# Patient Record
Sex: MALE | Race: WHITE | NOT HISPANIC OR LATINO | URBAN - METROPOLITAN AREA
[De-identification: names, ages, dates, MRNs, and addresses within clinical notes are randomized per-mention and may not be internally consistent; named-entity substitution may affect disease eponyms.]

---

## 2018-07-05 RX ORDER — FENTANYL CITRATE 50 UG/ML
50 INJECTION INTRAVENOUS
Qty: 0 | Refills: 0 | Status: DISCONTINUED | OUTPATIENT
Start: 2018-07-06 | End: 2018-07-06

## 2018-07-05 RX ORDER — OXYCODONE HYDROCHLORIDE 5 MG/1
10 TABLET ORAL EVERY 6 HOURS
Qty: 0 | Refills: 0 | Status: DISCONTINUED | OUTPATIENT
Start: 2018-07-06 | End: 2018-07-06

## 2018-07-06 ENCOUNTER — OUTPATIENT (OUTPATIENT)
Dept: OUTPATIENT SERVICES | Facility: HOSPITAL | Age: 18
LOS: 1 days | Discharge: HOME | End: 2018-07-06
Payer: COMMERCIAL

## 2018-07-06 ENCOUNTER — RESULT REVIEW (OUTPATIENT)
Age: 18
End: 2018-07-06

## 2018-07-06 VITALS
WEIGHT: 160.94 LBS | SYSTOLIC BLOOD PRESSURE: 111 MMHG | HEART RATE: 73 BPM | TEMPERATURE: 98 F | OXYGEN SATURATION: 100 % | DIASTOLIC BLOOD PRESSURE: 74 MMHG | RESPIRATION RATE: 16 BRPM | HEIGHT: 73 IN

## 2018-07-06 VITALS
SYSTOLIC BLOOD PRESSURE: 130 MMHG | TEMPERATURE: 98 F | HEART RATE: 66 BPM | DIASTOLIC BLOOD PRESSURE: 72 MMHG | OXYGEN SATURATION: 99 % | RESPIRATION RATE: 16 BRPM

## 2018-07-06 DIAGNOSIS — Z98.890 OTHER SPECIFIED POSTPROCEDURAL STATES: Chronic | ICD-10-CM

## 2018-07-06 PROCEDURE — 88300 SURGICAL PATH GROSS: CPT | Mod: 26

## 2018-07-06 RX ORDER — SODIUM CHLORIDE 9 MG/ML
1000 INJECTION, SOLUTION INTRAVENOUS
Qty: 0 | Refills: 0 | Status: DISCONTINUED | OUTPATIENT
Start: 2018-07-06 | End: 2018-07-06

## 2018-07-06 RX ORDER — OXYCODONE HYDROCHLORIDE 5 MG/1
5 TABLET ORAL EVERY 4 HOURS
Qty: 0 | Refills: 0 | Status: DISCONTINUED | OUTPATIENT
Start: 2018-07-06 | End: 2018-07-06

## 2018-07-06 RX ORDER — ONDANSETRON 8 MG/1
4 TABLET, FILM COATED ORAL ONCE
Qty: 0 | Refills: 0 | Status: DISCONTINUED | OUTPATIENT
Start: 2018-07-06 | End: 2018-07-06

## 2018-07-06 RX ADMIN — OXYCODONE HYDROCHLORIDE 10 MILLIGRAM(S): 5 TABLET ORAL at 17:03

## 2018-07-06 NOTE — BRIEF OPERATIVE NOTE - PROCEDURE
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

## 2018-07-06 NOTE — ASU PATIENT PROFILE, ADULT - PMH
Allergic rhinitis    Deviated nasal septum    Eczema    Hernia  bilateral inguinal and umbilical age 1  History of mononucleosis    Lactose intolerance    Tonsillar hypertrophy

## 2018-07-06 NOTE — BRIEF OPERATIVE NOTE - POST-OP DX
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

## 2018-07-09 LAB — SURGICAL PATHOLOGY FINAL REPORT - CH: SIGNIFICANT CHANGE UP

## 2018-07-11 DIAGNOSIS — J34.89 OTHER SPECIFIED DISORDERS OF NOSE AND NASAL SINUSES: ICD-10-CM

## 2018-07-11 DIAGNOSIS — J34.2 DEVIATED NASAL SEPTUM: ICD-10-CM

## 2018-07-11 DIAGNOSIS — J34.3 HYPERTROPHY OF NASAL TURBINATES: ICD-10-CM

## 2018-07-11 DIAGNOSIS — J30.1 ALLERGIC RHINITIS DUE TO POLLEN: ICD-10-CM

## 2018-07-11 DIAGNOSIS — J32.9 CHRONIC SINUSITIS, UNSPECIFIED: ICD-10-CM

## 2018-07-11 DIAGNOSIS — L30.9 DERMATITIS, UNSPECIFIED: ICD-10-CM

## 2018-07-11 DIAGNOSIS — Z87.01 PERSONAL HISTORY OF PNEUMONIA (RECURRENT): ICD-10-CM

## 2018-07-11 DIAGNOSIS — E73.9 LACTOSE INTOLERANCE, UNSPECIFIED: ICD-10-CM

## 2018-07-11 DIAGNOSIS — J35.1 HYPERTROPHY OF TONSILS: ICD-10-CM

## 2020-01-22 ENCOUNTER — TRANSCRIPTION ENCOUNTER (OUTPATIENT)
Age: 20
End: 2020-01-22

## 2020-11-12 ENCOUNTER — TRANSCRIPTION ENCOUNTER (OUTPATIENT)
Age: 20
End: 2020-11-12

## 2020-12-30 ENCOUNTER — TRANSCRIPTION ENCOUNTER (OUTPATIENT)
Age: 20
End: 2020-12-30

## 2021-03-05 ENCOUNTER — TRANSCRIPTION ENCOUNTER (OUTPATIENT)
Age: 21
End: 2021-03-05

## 2021-04-24 ENCOUNTER — TRANSCRIPTION ENCOUNTER (OUTPATIENT)
Age: 21
End: 2021-04-24

## 2021-07-12 ENCOUNTER — TRANSCRIPTION ENCOUNTER (OUTPATIENT)
Age: 21
End: 2021-07-12

## 2021-07-12 ENCOUNTER — EMERGENCY (EMERGENCY)
Facility: HOSPITAL | Age: 21
LOS: 0 days | Discharge: HOME | End: 2021-07-12
Attending: EMERGENCY MEDICINE
Payer: COMMERCIAL

## 2021-07-12 VITALS
RESPIRATION RATE: 22 BRPM | HEART RATE: 150 BPM | SYSTOLIC BLOOD PRESSURE: 98 MMHG | DIASTOLIC BLOOD PRESSURE: 54 MMHG | TEMPERATURE: 98 F | OXYGEN SATURATION: 93 %

## 2021-07-12 VITALS — HEIGHT: 73 IN | WEIGHT: 184.97 LBS

## 2021-07-12 DIAGNOSIS — T78.40XA ALLERGY, UNSPECIFIED, INITIAL ENCOUNTER: ICD-10-CM

## 2021-07-12 DIAGNOSIS — R06.02 SHORTNESS OF BREATH: ICD-10-CM

## 2021-07-12 DIAGNOSIS — Z86.19 PERSONAL HISTORY OF OTHER INFECTIOUS AND PARASITIC DISEASES: ICD-10-CM

## 2021-07-12 DIAGNOSIS — Z87.2 PERSONAL HISTORY OF DISEASES OF THE SKIN AND SUBCUTANEOUS TISSUE: ICD-10-CM

## 2021-07-12 DIAGNOSIS — Z87.19 PERSONAL HISTORY OF OTHER DISEASES OF THE DIGESTIVE SYSTEM: ICD-10-CM

## 2021-07-12 DIAGNOSIS — T36.8X1A POISONING BY OTHER SYSTEMIC ANTIBIOTICS, ACCIDENTAL (UNINTENTIONAL), INITIAL ENCOUNTER: ICD-10-CM

## 2021-07-12 DIAGNOSIS — Z87.09 PERSONAL HISTORY OF OTHER DISEASES OF THE RESPIRATORY SYSTEM: ICD-10-CM

## 2021-07-12 DIAGNOSIS — Z98.890 OTHER SPECIFIED POSTPROCEDURAL STATES: ICD-10-CM

## 2021-07-12 DIAGNOSIS — R21 RASH AND OTHER NONSPECIFIC SKIN ERUPTION: ICD-10-CM

## 2021-07-12 DIAGNOSIS — R22.0 LOCALIZED SWELLING, MASS AND LUMP, HEAD: ICD-10-CM

## 2021-07-12 DIAGNOSIS — Z98.890 OTHER SPECIFIED POSTPROCEDURAL STATES: Chronic | ICD-10-CM
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
## 2021-07-12 DIAGNOSIS — Z91.011 ALLERGY TO MILK PRODUCTS: ICD-10-CM

## 2021-07-12 PROBLEM — J30.9 ALLERGIC RHINITIS, UNSPECIFIED: Chronic | Status: ACTIVE | Noted: 2018-07-06

## 2021-07-12 PROBLEM — J34.2 DEVIATED NASAL SEPTUM: Chronic | Status: ACTIVE | Noted: 2018-07-06

## 2021-07-12 PROBLEM — L30.9 DERMATITIS, UNSPECIFIED: Chronic | Status: ACTIVE | Noted: 2018-07-06

## 2021-07-12 PROBLEM — E73.9 LACTOSE INTOLERANCE, UNSPECIFIED: Chronic | Status: ACTIVE | Noted: 2018-07-06

## 2021-07-12 PROBLEM — K46.9 UNSPECIFIED ABDOMINAL HERNIA WITHOUT OBSTRUCTION OR GANGRENE: Chronic | Status: ACTIVE | Noted: 2018-07-06

## 2021-07-12 PROBLEM — J35.1 HYPERTROPHY OF TONSILS: Chronic | Status: ACTIVE | Noted: 2018-07-06

## 2021-07-12 PROCEDURE — 99284 EMERGENCY DEPT VISIT MOD MDM: CPT | Mod: 25

## 2021-07-12 PROCEDURE — 93005 ELECTROCARDIOGRAM TRACING: CPT

## 2021-07-12 PROCEDURE — 96375 TX/PRO/DX INJ NEW DRUG ADDON: CPT

## 2021-07-12 PROCEDURE — 99284 EMERGENCY DEPT VISIT MOD MDM: CPT

## 2021-07-12 PROCEDURE — 93010 ELECTROCARDIOGRAM REPORT: CPT

## 2021-07-12 PROCEDURE — 96374 THER/PROPH/DIAG INJ IV PUSH: CPT

## 2021-07-12 RX ORDER — SODIUM CHLORIDE 9 MG/ML
1000 INJECTION INTRAMUSCULAR; INTRAVENOUS; SUBCUTANEOUS ONCE
Refills: 0 | Status: COMPLETED | OUTPATIENT
Start: 2021-07-12 | End: 2021-07-12

## 2021-07-12 RX ORDER — DIPHENHYDRAMINE HCL 50 MG
25 CAPSULE ORAL ONCE
Refills: 0 | Status: COMPLETED | OUTPATIENT
Start: 2021-07-12 | End: 2021-07-12

## 2021-07-12 RX ORDER — FAMOTIDINE 10 MG/ML
20 INJECTION INTRAVENOUS ONCE
Refills: 0 | Status: COMPLETED | OUTPATIENT
Start: 2021-07-12 | End: 2021-07-12

## 2021-07-12 RX ORDER — FAMOTIDINE 10 MG/ML
1 INJECTION INTRAVENOUS
Qty: 5 | Refills: 0
Start: 2021-07-12 | End: 2021-07-16

## 2021-07-12 RX ORDER — NITROFURANTOIN MACROCRYSTAL 50 MG
1 CAPSULE ORAL
Qty: 14 | Refills: 0
Start: 2021-07-12 | End: 2021-07-18

## 2021-07-12 RX ORDER — DIPHENHYDRAMINE HCL 50 MG
1 CAPSULE ORAL
Qty: 21 | Refills: 0
Start: 2021-07-12 | End: 2021-07-18

## 2021-07-12 RX ADMIN — Medication 125 MILLIGRAM(S): at 16:52

## 2021-07-12 RX ADMIN — SODIUM CHLORIDE 1000 MILLILITER(S): 9 INJECTION INTRAMUSCULAR; INTRAVENOUS; SUBCUTANEOUS at 16:55

## 2021-07-12 RX ADMIN — FAMOTIDINE 20 MILLIGRAM(S): 10 INJECTION INTRAVENOUS at 16:52

## 2021-07-12 RX ADMIN — Medication 25 MILLIGRAM(S): at 16:52

## 2021-07-12 NOTE — ED PROVIDER NOTE - NS_ ATTENDINGSCRIBEDETAILS _ED_A_ED_FT
I, Choco Gregory MD,  performed the initial face to face bedside interview with this patient regarding history of present illness, review of symptoms and relevant past medical, social and family history.  I completed an independent physical examination.    The history, relevant review of systems, past medical and surgical history, medical decision making, and physical examination was documented by the scribe in my presence and I attest to the accuracy of the documentation.

## 2021-07-12 NOTE — ED PROVIDER NOTE - OBJECTIVE STATEMENT
20 y/o M with PMHx of s/p hernia repair presents to the ED BIB mother from home c/o +diffuse erythematous rash to body s/p taking 1st dose of ciprofloxacin this afternoon. Reports going to UC yesterday for suspected UTI, was prescribed abx. After taking today pt immediately began having rash with some +SOB and +facial swelling. In ED SOB now resolved. No fever or itchy eyes. Did not take anything for allergic rxn PTA. NKDA.

## 2021-07-12 NOTE — ED ADULT TRIAGE NOTE - CHIEF COMPLAINT QUOTE
Patient having allergic reaction starting 15 mins ago, about 20 mins ago took Cipro for the first time.  Patient has hives, swelling to the face and trouble breathing.

## 2021-07-12 NOTE — ED PROVIDER NOTE - PATIENT PORTAL LINK FT
You can access the FollowMyHealth Patient Portal offered by [facility] by registering at the following website: [email or web address]. By joining [email or web address]’s FollowMyHealth portal, you will also be able to view your health information using other applications (apps) compatible with our system.

## 2021-07-12 NOTE — ED ADULT NURSE NOTE - NSIMPLEMENTINTERV_GEN_ALL_ED
Implemented All Universal Safety Interventions:  [place] to call system. Call bell, personal items and telephone within reach. Instruct patient to call for assistance. Room bathroom lighting operational. Non-slip footwear when patient is off stretcher. Physically safe environment: no spills, clutter or unnecessary equipment. Stretcher in lowest position, wheels locked, appropriate side rails in place.

## 2021-07-12 NOTE — ED ADULT NURSE NOTE - OBJECTIVE STATEMENT
Pt states 20 minutes after taking Cipro 500mg and OTC CranX and developed allergic reaction. Pt to ED with facial swelling, hives, urticaria. Pt denies difficulty breathing at this time but was dyspneic prior to ED arrival. IV established with NS running at this time. Awaiting MD mccurdy.

## 2021-07-13 PROBLEM — Z00.00 ENCOUNTER FOR PREVENTIVE HEALTH EXAMINATION: Status: ACTIVE | Noted: 2021-07-13

## 2021-07-14 ENCOUNTER — APPOINTMENT (OUTPATIENT)
Dept: UROLOGY | Facility: CLINIC | Age: 21
End: 2021-07-14
Payer: COMMERCIAL

## 2021-07-14 VITALS
SYSTOLIC BLOOD PRESSURE: 138 MMHG | HEIGHT: 74 IN | WEIGHT: 180 LBS | OXYGEN SATURATION: 97 % | BODY MASS INDEX: 23.1 KG/M2 | DIASTOLIC BLOOD PRESSURE: 88 MMHG | HEART RATE: 95 BPM

## 2021-07-14 DIAGNOSIS — Z78.9 OTHER SPECIFIED HEALTH STATUS: ICD-10-CM

## 2021-07-14 DIAGNOSIS — Z72.0 TOBACCO USE: ICD-10-CM

## 2021-07-14 DIAGNOSIS — R30.0 DYSURIA: ICD-10-CM

## 2021-07-14 PROCEDURE — 99243 OFF/OP CNSLTJ NEW/EST LOW 30: CPT

## 2021-07-14 PROCEDURE — 99072 ADDL SUPL MATRL&STAF TM PHE: CPT

## 2021-07-14 NOTE — HISTORY OF PRESENT ILLNESS
[FreeTextEntry1] : This patient states that he has developed intermittent dysuria and left testicular discomfort over the past several weeks.  He indicates that he had full STD testing and a negative urine analysis.  He denies sexual relations with any new partners in the recent past.

## 2021-07-14 NOTE — END OF VISIT
[FreeTextEntry3] : A scrotal sonogram is ordered and I prescribed doxycycline.  Depending on his response and the results follow-up will be determined

## 2021-07-15 ENCOUNTER — OUTPATIENT (OUTPATIENT)
Dept: OUTPATIENT SERVICES | Facility: HOSPITAL | Age: 21
LOS: 1 days | End: 2021-07-15
Payer: COMMERCIAL

## 2021-07-15 ENCOUNTER — APPOINTMENT (OUTPATIENT)
Dept: ULTRASOUND IMAGING | Facility: CLINIC | Age: 21
End: 2021-07-15
Payer: COMMERCIAL

## 2021-07-15 DIAGNOSIS — Z98.890 OTHER SPECIFIED POSTPROCEDURAL STATES: Chronic | ICD-10-CM

## 2021-07-15 DIAGNOSIS — N50.812 LEFT TESTICULAR PAIN: ICD-10-CM

## 2021-07-15 PROCEDURE — 93975 VASCULAR STUDY: CPT | Mod: 26

## 2021-07-15 PROCEDURE — 93975 VASCULAR STUDY: CPT

## 2021-08-24 ENCOUNTER — APPOINTMENT (OUTPATIENT)
Dept: UROLOGY | Facility: CLINIC | Age: 21
End: 2021-08-24
Payer: COMMERCIAL

## 2021-08-24 VITALS
SYSTOLIC BLOOD PRESSURE: 114 MMHG | OXYGEN SATURATION: 98 % | BODY MASS INDEX: 23.1 KG/M2 | HEART RATE: 60 BPM | DIASTOLIC BLOOD PRESSURE: 72 MMHG | TEMPERATURE: 97.2 F | WEIGHT: 180 LBS | HEIGHT: 74 IN

## 2021-08-24 DIAGNOSIS — N50.812 LEFT TESTICULAR PAIN: ICD-10-CM

## 2021-08-24 DIAGNOSIS — I86.1 SCROTAL VARICES: ICD-10-CM

## 2021-08-24 PROCEDURE — 99213 OFFICE O/P EST LOW 20 MIN: CPT

## 2021-08-24 PROCEDURE — 55250 REMOVAL OF SPERM DUCT(S): CPT

## 2021-08-24 NOTE — HISTORY OF PRESENT ILLNESS
[FreeTextEntry1] : 21 year old man seen 08/24/2021 with complaint of left testicular pain. This began about a month ago. Patient was seen by Dr. Perera and his scrotal US shows a mild left varicocele. Patient reports his left testicular discomfort remains the same, reports it is not painful just "uncomfortable". \par It is moderate in severity. Nothing makes the symptoms better, nothing makes sx worse. \par It is associated with nothing.\par No hematuria, no dysuria, no frequency, no urgency, no hesitancy, no straining. No incontinence. \par No fevers, no chills, no nausea, no vomiting, no flank pain. \par \par No family history contributory to Varicocele \par

## 2021-08-24 NOTE — ASSESSMENT
[FreeTextEntry1] : 22 yo male with c/o of left testicular discomfort. Left spermatic cord proximal to the testicle very sensitive to gentle palpation on exam. Scrotal US with mild left varicocele.\par Discussed may cause scrotal pain/discomfort, decreased testicle size, poor sperm production and decreased semen quality\par Presence of a varicocele does not necessitate surgical correction\par Recommended patient have a semen analysis to R/O infertility\par Discussed if semen analysis is normal, then Varicocelectomy can be performed electively for symptom relief. \par RTO after semen analysis \par \par

## 2021-08-24 NOTE — PHYSICAL EXAM
[General Appearance - Well Developed] : well developed [General Appearance - Well Nourished] : well nourished [Normal Appearance] : normal appearance [Well Groomed] : well groomed [General Appearance - In No Acute Distress] : no acute distress [Abdomen Soft] : soft [Abdomen Tenderness] : non-tender [Costovertebral Angle Tenderness] : no ~M costovertebral angle tenderness [Urethral Meatus] : meatus normal [Urinary Bladder Findings] : the bladder was normal on palpation [Scrotum] : the scrotum was normal [Epididymis] : the epididymides were normal [Testes Tenderness] : no tenderness of the testes [Testes Mass (___cm)] : there were no testicular masses [Edema] : no peripheral edema [] : no respiratory distress [Respiration, Rhythm And Depth] : normal respiratory rhythm and effort [Exaggerated Use Of Accessory Muscles For Inspiration] : no accessory muscle use [Oriented To Time, Place, And Person] : oriented to person, place, and time [Affect] : the affect was normal [Mood] : the mood was normal [Not Anxious] : not anxious [Normal Station and Gait] : the gait and station were normal for the patient's age [No Focal Deficits] : no focal deficits [No Palpable Adenopathy] : no palpable adenopathy [FreeTextEntry1] : Left spermatic cord proximal to the testicle very sensitive to gentle palpation. Pt refused rectal exam to assess for PFD

## 2021-09-07 ENCOUNTER — APPOINTMENT (OUTPATIENT)
Dept: UROLOGY | Facility: CLINIC | Age: 21
End: 2021-09-07

## 2021-12-22 ENCOUNTER — TRANSCRIPTION ENCOUNTER (OUTPATIENT)
Age: 21
End: 2021-12-22

## 2022-02-22 ENCOUNTER — EMERGENCY (EMERGENCY)
Facility: HOSPITAL | Age: 22
LOS: 0 days | Discharge: HOME | End: 2022-02-23
Attending: EMERGENCY MEDICINE
Payer: COMMERCIAL

## 2022-02-22 VITALS
OXYGEN SATURATION: 96 % | HEIGHT: 72 IN | WEIGHT: 169.98 LBS | DIASTOLIC BLOOD PRESSURE: 80 MMHG | SYSTOLIC BLOOD PRESSURE: 122 MMHG | HEART RATE: 143 BPM | RESPIRATION RATE: 18 BRPM | TEMPERATURE: 99 F
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
## 2022-02-22 DIAGNOSIS — R22.0 LOCALIZED SWELLING, MASS AND LUMP, HEAD: ICD-10-CM

## 2022-02-22 DIAGNOSIS — T78.40XA ALLERGY, UNSPECIFIED, INITIAL ENCOUNTER: ICD-10-CM

## 2022-02-22 DIAGNOSIS — Z98.890 OTHER SPECIFIED POSTPROCEDURAL STATES: Chronic | ICD-10-CM

## 2022-02-22 LAB
ANION GAP SERPL CALC-SCNC: 9 MMOL/L — SIGNIFICANT CHANGE UP (ref 5–17)
BASOPHILS # BLD AUTO: 0.02 K/UL — SIGNIFICANT CHANGE UP (ref 0–0.2)
BASOPHILS NFR BLD AUTO: 0.2 % — SIGNIFICANT CHANGE UP (ref 0–2)
BUN SERPL-MCNC: 14 MG/DL — SIGNIFICANT CHANGE UP (ref 7–23)
CALCIUM SERPL-MCNC: 9.8 MG/DL — SIGNIFICANT CHANGE UP (ref 8.5–10.1)
CHLORIDE SERPL-SCNC: 104 MMOL/L — SIGNIFICANT CHANGE UP (ref 96–108)
CO2 SERPL-SCNC: 26 MMOL/L — SIGNIFICANT CHANGE UP (ref 22–31)
CREAT SERPL-MCNC: 1.33 MG/DL — HIGH (ref 0.5–1.3)
EOSINOPHIL # BLD AUTO: 0.07 K/UL — SIGNIFICANT CHANGE UP (ref 0–0.5)
EOSINOPHIL NFR BLD AUTO: 0.6 % — SIGNIFICANT CHANGE UP (ref 0–6)
GLUCOSE SERPL-MCNC: 128 MG/DL — HIGH (ref 70–99)
HCT VFR BLD CALC: 50.9 % — HIGH (ref 39–50)
HGB BLD-MCNC: 17.6 G/DL — HIGH (ref 13–17)
IMM GRANULOCYTES NFR BLD AUTO: 0.3 % — SIGNIFICANT CHANGE UP (ref 0–1.5)
LYMPHOCYTES # BLD AUTO: 1.69 K/UL — SIGNIFICANT CHANGE UP (ref 1–3.3)
LYMPHOCYTES # BLD AUTO: 13.5 % — SIGNIFICANT CHANGE UP (ref 13–44)
MCHC RBC-ENTMCNC: 30.9 PG — SIGNIFICANT CHANGE UP (ref 27–34)
MCHC RBC-ENTMCNC: 34.6 GM/DL — SIGNIFICANT CHANGE UP (ref 32–36)
MCV RBC AUTO: 89.3 FL — SIGNIFICANT CHANGE UP (ref 80–100)
MONOCYTES # BLD AUTO: 0.89 K/UL — SIGNIFICANT CHANGE UP (ref 0–0.9)
MONOCYTES NFR BLD AUTO: 7.1 % — SIGNIFICANT CHANGE UP (ref 2–14)
NEUTROPHILS # BLD AUTO: 9.78 K/UL — HIGH (ref 1.8–7.4)
NEUTROPHILS NFR BLD AUTO: 78.3 % — HIGH (ref 43–77)
PLATELET # BLD AUTO: 289 K/UL — SIGNIFICANT CHANGE UP (ref 150–400)
POTASSIUM SERPL-MCNC: 3.5 MMOL/L — SIGNIFICANT CHANGE UP (ref 3.5–5.3)
POTASSIUM SERPL-SCNC: 3.5 MMOL/L — SIGNIFICANT CHANGE UP (ref 3.5–5.3)
RBC # BLD: 5.7 M/UL — SIGNIFICANT CHANGE UP (ref 4.2–5.8)
RBC # FLD: 12.3 % — SIGNIFICANT CHANGE UP (ref 10.3–14.5)
SODIUM SERPL-SCNC: 139 MMOL/L — SIGNIFICANT CHANGE UP (ref 135–145)
WBC # BLD: 12.49 K/UL — HIGH (ref 3.8–10.5)
WBC # FLD AUTO: 12.49 K/UL — HIGH (ref 3.8–10.5)

## 2022-02-22 PROCEDURE — 93005 ELECTROCARDIOGRAM TRACING: CPT

## 2022-02-22 PROCEDURE — 96374 THER/PROPH/DIAG INJ IV PUSH: CPT

## 2022-02-22 PROCEDURE — 99284 EMERGENCY DEPT VISIT MOD MDM: CPT | Mod: 25

## 2022-02-22 PROCEDURE — 80048 BASIC METABOLIC PNL TOTAL CA: CPT

## 2022-02-22 PROCEDURE — 93010 ELECTROCARDIOGRAM REPORT: CPT | Mod: 76

## 2022-02-22 PROCEDURE — 85025 COMPLETE CBC W/AUTO DIFF WBC: CPT

## 2022-02-22 PROCEDURE — 96375 TX/PRO/DX INJ NEW DRUG ADDON: CPT

## 2022-02-22 PROCEDURE — 99285 EMERGENCY DEPT VISIT HI MDM: CPT

## 2022-02-22 PROCEDURE — 36415 COLL VENOUS BLD VENIPUNCTURE: CPT

## 2022-02-22 PROCEDURE — 82550 ASSAY OF CK (CPK): CPT

## 2022-02-22 RX ORDER — DIPHENHYDRAMINE HCL 50 MG
50 CAPSULE ORAL ONCE
Refills: 0 | Status: COMPLETED | OUTPATIENT
Start: 2022-02-22 | End: 2022-02-22

## 2022-02-22 RX ORDER — DEXAMETHASONE 0.5 MG/5ML
6 ELIXIR ORAL ONCE
Refills: 0 | Status: COMPLETED | OUTPATIENT
Start: 2022-02-22 | End: 2022-02-22

## 2022-02-22 RX ORDER — SODIUM CHLORIDE 9 MG/ML
1000 INJECTION INTRAMUSCULAR; INTRAVENOUS; SUBCUTANEOUS ONCE
Refills: 0 | Status: COMPLETED | OUTPATIENT
Start: 2022-02-22 | End: 2022-02-22

## 2022-02-22 RX ORDER — FAMOTIDINE 10 MG/ML
20 INJECTION INTRAVENOUS ONCE
Refills: 0 | Status: COMPLETED | OUTPATIENT
Start: 2022-02-22 | End: 2022-02-22

## 2022-02-22 RX ADMIN — Medication 50 MILLIGRAM(S): at 23:12

## 2022-02-22 RX ADMIN — FAMOTIDINE 20 MILLIGRAM(S): 10 INJECTION INTRAVENOUS at 23:12

## 2022-02-22 RX ADMIN — SODIUM CHLORIDE 1000 MILLILITER(S): 9 INJECTION INTRAMUSCULAR; INTRAVENOUS; SUBCUTANEOUS at 23:10

## 2022-02-22 RX ADMIN — Medication 6 MILLIGRAM(S): at 23:13

## 2022-02-22 NOTE — ED PROVIDER NOTE - OBJECTIVE STATEMENT
20 y/o M with no PMHx p/w facial swelling, pruritis and sob that began about 3 hours after he drank almond milk.  Pt notes he drank the almond milk at about 3 pm then started noticing pruritis and mild facial swelling around 6 pm.  He was able to play in an indoor soccer game from 9 to 9:50 pm without any CP or SOB but then noted increased facial swelling shortly after the end of the game.  Pt hasn't taken anything PTA.  Pt was seen in this ED on 7/12/21 for a suspected allergic rxn to amoxicillin and had an EKG during that visit that is similar to the EKG today.

## 2022-02-22 NOTE — ED PROVIDER NOTE - PROGRESS NOTE DETAILS
Symptoms resolved and HR decreased to 60s.  Lungs CTAB and airway patent.  Pt states he has Epi pen at home.  Pt advised to f/u with PCP/allergist and given strict return precautions.  Krunal Melendez, DO

## 2022-02-22 NOTE — ED PROVIDER NOTE - ENMT, MLM
Airway patent, Nasal mucosa clear. Mouth with normal mucosa. Throat has no vesicles, no oropharyngeal exudates and uvula is midline.  mild bilateral facial swelling

## 2022-02-22 NOTE — ED PROVIDER NOTE - NSICDXPASTMEDICALHX_GEN_ALL_CORE_FT
PAST MEDICAL HISTORY:  Allergic rhinitis     Deviated nasal septum     Eczema     Hernia bilateral inguinal and umbilical age 1    History of mononucleosis     Lactose intolerance     Tonsillar hypertrophy

## 2022-02-22 NOTE — ED PROVIDER NOTE - NSFOLLOWUPINSTRUCTIONS_ED_ALL_ED_FT
Allergic Reaction    An allergic reaction is an abnormal reaction to a substance (allergen) by the body's defense system. Common allergens include medicines, food, insect bites or stings, and blood products. The body releases certain proteins into the blood that can cause a variety of symptoms such as an itchy rash, wheezing, swelling of the face/lips/tongue/throat, abdominal pain, nausea or vomiting. An allergic reaction is usually treated with medication. If your health care provider prescribed you an epinephrine injection device, make sure to keep it with you at all times.    SEEK IMMEDIATE MEDICAL CARE IF YOU HAVE ANY OF THE FOLLOWING SYMPTOMS: allergic reaction severe enough that required you to use epinephrine, tightness in your chest, swelling around your lips/tongue/throat, abdominal pain, vomiting or diarrhea, or lightheadedness/dizziness. These symptoms may represent a serious problem that is an emergency. Do not wait to see if the symptoms will go away. Use your auto-injector pen or anaphylaxis kit as you have been instructed. Call 911 and do not drive yourself to the hospital.      Follow up with your pcp and allergist in 1-3 days

## 2022-02-22 NOTE — ED ADULT TRIAGE NOTE - CHIEF COMPLAINT QUOTE
Pt A&Ox4, presents to the ED c/o allergic reaction to almond milk. Pt c/o itching, redness, and swelling to his entire body. Pt reports mild SOB. No swelling noted to tongue. Respirations equal and unlabored. No medication PTA. Pt sent for STAT EKG. Pt A&Ox4, presents to the ED c/o allergic reaction to almond milk. Pt c/o itching, redness, and swelling to his entire body. Pt reports mild SOB. No swelling noted to tongue. Respirations equal and unlabored. SpO2 94% on room air, HR 160s. Pt states he just played soccer. No medication PTA. Pt sent for STAT EKG.

## 2022-02-22 NOTE — ED PROVIDER NOTE - PATIENT PORTAL LINK FT
You can access the FollowMyHealth Patient Portal offered by [facility] by registering at the following website: [email or web address]. By joining [employer]’s FollowMyHealth portal, you will also be able to view your health information using other applications (apps) compatible with our system.

## 2022-02-23 LAB — ADD ON TEST-SPECIMEN IN LAB: SIGNIFICANT CHANGE UP

## 2022-02-23 RX ADMIN — SODIUM CHLORIDE 1000 MILLILITER(S): 9 INJECTION INTRAMUSCULAR; INTRAVENOUS; SUBCUTANEOUS at 00:27

## 2022-02-23 NOTE — ED ADULT NURSE NOTE - OBJECTIVE STATEMENT
pt arrived to ED c/o itchiness, redness after drinking almond milk. pt states he drank almond milk today around 1500 and felt short of breath and around 1700 felt itchy and felt slightly short of breath with redness on face chest and upper extremities. pt states he has no allergies but has been to the ED in the past after a similar episode. pt states he is no longer short of breath and denies chest pain. pt denies taking any medications PTA. pt alert and oriented.

## 2022-03-18 ENCOUNTER — NON-APPOINTMENT (OUTPATIENT)
Age: 22
End: 2022-03-18

## 2022-03-18 ENCOUNTER — APPOINTMENT (OUTPATIENT)
Dept: CARDIOLOGY | Facility: CLINIC | Age: 22
End: 2022-03-18
Payer: COMMERCIAL

## 2022-03-18 VITALS
HEIGHT: 74 IN | WEIGHT: 181 LBS | BODY MASS INDEX: 23.23 KG/M2 | HEART RATE: 64 BPM | SYSTOLIC BLOOD PRESSURE: 112 MMHG | DIASTOLIC BLOOD PRESSURE: 70 MMHG | OXYGEN SATURATION: 99 %

## 2022-03-18 PROCEDURE — 93000 ELECTROCARDIOGRAM COMPLETE: CPT

## 2022-03-18 PROCEDURE — 99203 OFFICE O/P NEW LOW 30 MIN: CPT

## 2022-03-18 NOTE — CARDIOLOGY SUMMARY
[de-identified] : 3/18/22.  Sinus  Rhythm with short LA interval\par Increased QRS voltage\par Nonspecific ST abnormality

## 2022-03-18 NOTE — PHYSICAL EXAM
[No Acute Distress] : no acute distress [Normal S1, S2] : normal S1, S2 [No Murmur] : no murmur [Clear Lung Fields] : clear lung fields [Soft] : abdomen soft [Normal Gait] : normal gait [No Edema] : no edema [No Rash] : no rash [Moves all extremities] : moves all extremities [Alert and Oriented] : alert and oriented [de-identified] : Slender [de-identified] : Pupils are round [de-identified] : Wearing a face mask [de-identified] : No JVD

## 2022-03-18 NOTE — HISTORY OF PRESENT ILLNESS
[FreeTextEntry1] : Roberto Mera is a 21-year-old man with no chronic medical problems who presents for cardiology consultation because of an abnormality on his ECG.\par \par He was seen in the emergency department at [facility] several weeks ago following an allergic reaction -- an ECG was performed during that visit and found to be abnormal (QRS had a right axis deviation and large amplitude voltage throughout).  He has no past history of heart disease and describes a good baseline functional status and exercise capacity -- plays soccer.  There is no family history of sudden cardiac death or other premature heart disease.  He feels well -- no palpitations, chest pain, difficulty breathing, or syncope.

## 2022-03-18 NOTE — REVIEW OF SYSTEMS
[Dyspnea on exertion] : not dyspnea during exertion [Chest Discomfort] : no chest discomfort [Palpitations] : no palpitations [Syncope] : no syncope [Negative] : Heme/Lymph

## 2022-03-18 NOTE — DISCUSSION/SUMMARY
[FreeTextEntry1] : \par Abnormal ECG: I reviewed 2 ECGs that were performed while he was in the emergency department and compared it to today's tracing; all ECGs have increased QRS voltage; occasionally noted right axis deviation and mild nonspecific ST and T wave abnormality -- asymptomatic and without past heart disease.  I recommend echocardiography to exclude the presence of structural abnormalities - I will contact him by telephone to discuss results.

## 2022-03-30 ENCOUNTER — APPOINTMENT (OUTPATIENT)
Dept: CARDIOLOGY | Facility: CLINIC | Age: 22
End: 2022-03-30
Payer: COMMERCIAL

## 2022-03-30 PROCEDURE — 93306 TTE W/DOPPLER COMPLETE: CPT

## 2022-05-10 ENCOUNTER — APPOINTMENT (OUTPATIENT)
Dept: MRI IMAGING | Facility: CLINIC | Age: 22
End: 2022-05-10
Payer: COMMERCIAL

## 2022-05-10 ENCOUNTER — OUTPATIENT (OUTPATIENT)
Dept: OUTPATIENT SERVICES | Facility: HOSPITAL | Age: 22
LOS: 1 days | End: 2022-05-10
Payer: COMMERCIAL

## 2022-05-10 DIAGNOSIS — I51.9 HEART DISEASE, UNSPECIFIED: ICD-10-CM

## 2022-05-10 DIAGNOSIS — Z00.8 ENCOUNTER FOR OTHER GENERAL EXAMINATION: ICD-10-CM

## 2022-05-10 DIAGNOSIS — Z98.890 OTHER SPECIFIED POSTPROCEDURAL STATES: Chronic | ICD-10-CM

## 2022-05-10 DIAGNOSIS — R94.31 ABNORMAL ELECTROCARDIOGRAM [ECG] [EKG]: ICD-10-CM

## 2022-05-10 PROCEDURE — A9585: CPT

## 2022-05-10 PROCEDURE — 75561 CARDIAC MRI FOR MORPH W/DYE: CPT

## 2022-05-10 PROCEDURE — 75561 CARDIAC MRI FOR MORPH W/DYE: CPT | Mod: 26

## 2022-06-06 ENCOUNTER — APPOINTMENT (OUTPATIENT)
Dept: HEART FAILURE | Facility: CLINIC | Age: 22
End: 2022-06-06

## 2022-06-06 VITALS
BODY MASS INDEX: 23.49 KG/M2 | SYSTOLIC BLOOD PRESSURE: 109 MMHG | DIASTOLIC BLOOD PRESSURE: 72 MMHG | TEMPERATURE: 97.6 F | HEIGHT: 74 IN | OXYGEN SATURATION: 98 % | RESPIRATION RATE: 16 BRPM | WEIGHT: 183 LBS | HEART RATE: 62 BPM

## 2022-06-06 PROCEDURE — 99203 OFFICE O/P NEW LOW 30 MIN: CPT

## 2022-06-15 NOTE — REVIEW OF SYSTEMS
[Negative] : Heme/Lymph [FreeTextEntry2] : per HPI [FreeTextEntry5] : per HPI [FreeTextEntry1] : All systems reviewed; pertinent positives and negatives reflected in HPI

## 2022-06-15 NOTE — HISTORY OF PRESENT ILLNESS
[FreeTextEntry1] : Mr. Mera is a 21 year old male with no chronic PMH, recently found to have abnormal ECG readings. Who presents today for initial consultation of his cardiomyopathy. Referred by Mari\par \par He has no past medical history of cardiac disease and during an ER visit in February 2022 for allergic reaction to almond milk (previously has had reaction to PO antibiotic in July 2021). At his recent ER visit he was found to have abnormal ECG tracing for which he was referred to cardiologist Dr. Guerra. His ECG readings during his March visit when compared to tracing from prior ED visits were concerning for increased QRS voltage. Therefore he had TTE performed which demonstrated reduced LV function of 40-45%. Subsequently, underwent cMRI which revealed reduced global LV function (EF 42%) and normal RVF without any myocardial edema or delayed enhancement in both ventricles. \par \par He reports to clinic today accompanied by his mother and states he has never had any functional limitations. He has been a long-time active basketball and  and endorses SOB that is not prolonged or progressively worsened recently, but appears normal in relation to stamina after prolonged activity.  He denies any LH/Dizziness or syncopal episodes while playing sports. He also performs routine work outs in the gym and has occasional episodes of LH/Dizziness while weight lifting but denies any syncopal events. He denies any episodes of LE edema, no abdominal distention, no orthopnea, PND, CP and Palpations. \par \par He has contracted COVID in the past, was not hospitalized and is now fully vaccinated with J+J vaccines

## 2022-06-15 NOTE — ASSESSMENT
[FreeTextEntry1] : 21 year old male with no chronic PMH, recently found to have abnormal ECG readings with subsequent ECHO and MRI findings with mild-mod LV dysfunction (personally believe it's closer to 45-50%). 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 On exam he is well appearing and euvolemic and I have made the following recommendations.\par \par # Reduced LVEF 40%) \par -Given he has no functional limitations at this time, he will repeat ECHO in 6 months time for surveillance \par I have asked him to follow up with his general cardiologist and myself in 6 months time to review any changes\par - Will obtain Labs today 6/6/22: CBC + Iron Studies, TSH, CMP, pro BMP, HIV 4th gen; Will call patient w/ lab results\par - Encouraged increase/adequate hydration, LH/Dizziness likely in setting of dehydration and moderately active exercise routine\par - will defer medication at this time\par \par RTC in 6 months with Dr. Mullins

## 2022-06-15 NOTE — CARDIOLOGY SUMMARY
[de-identified] : \par 3/18/22 - NSR, short NH interval, good RWP, non-specific ST-T changes [de-identified] : \par 3/30/22 TTE: LVEF 45-50%, LVIDd 5.5cm, nml LVSF, nml diastolic function, nml RVSF, normal BiAtria, min MR, min TR, RVSP 17 mm Hg [de-identified] : \par 5/20/22 cMRI: LVEF 42%, no regional wall motion abnormalities, no thrombus, no delayed enhancement of LVM,  normal RVSF, no delayed enhancement of RVM, no valvular abnormalities.\par \par \par

## 2022-06-15 NOTE — PHYSICAL EXAM
[No Acute Distress] : no acute distress [No Xanthelasma] : no xanthelasma [Normal S1, S2] : normal S1, S2 [No Murmur] : no murmur [No Rub] : no rub [No Gallop] : no gallop [Clear Lung Fields] : clear lung fields [Good Air Entry] : good air entry [No Respiratory Distress] : no respiratory distress  [Soft] : abdomen soft [Non Tender] : non-tender [No Edema] : no edema [Alert and Oriented] : alert and oriented [Normal] : normal venous pressure, no carotid bruit [Normal Venous Pressure] : normal venous pressure [de-identified] : warm , dry, and intact

## 2022-06-19 ENCOUNTER — NON-APPOINTMENT (OUTPATIENT)
Age: 22
End: 2022-06-19

## 2022-07-06 LAB
ALBUMIN SERPL ELPH-MCNC: 4.8 G/DL
ALP BLD-CCNC: 76 U/L
ALT SERPL-CCNC: 29 U/L
ANION GAP SERPL CALC-SCNC: 18 MMOL/L
AST SERPL-CCNC: 31 U/L
BASOPHILS # BLD AUTO: 0.05 K/UL
BASOPHILS NFR BLD AUTO: 1.3 %
BILIRUB SERPL-MCNC: 0.6 MG/DL
BUN SERPL-MCNC: 13 MG/DL
CALCIUM SERPL-MCNC: 9.5 MG/DL
CHLORIDE SERPL-SCNC: 104 MMOL/L
CO2 SERPL-SCNC: 20 MMOL/L
CREAT SERPL-MCNC: 1.19 MG/DL
EGFR: 87 ML/MIN/1.73M2
EOSINOPHIL # BLD AUTO: 0.17 K/UL
EOSINOPHIL NFR BLD AUTO: 4.3 %
FERRITIN SERPL-MCNC: 112 NG/ML
GLUCOSE SERPL-MCNC: 91 MG/DL
HCT VFR BLD CALC: 47 %
HGB BLD-MCNC: 15.6 G/DL
IMM GRANULOCYTES NFR BLD AUTO: 0 %
IRON SATN MFR SERPL: 19 %
IRON SERPL-MCNC: 68 UG/DL
LYMPHOCYTES # BLD AUTO: 1.44 K/UL
LYMPHOCYTES NFR BLD AUTO: 36.5 %
MAN DIFF?: NORMAL
MCHC RBC-ENTMCNC: 30.9 PG
MCHC RBC-ENTMCNC: 33.2 GM/DL
MCV RBC AUTO: 93.1 FL
MONOCYTES # BLD AUTO: 0.44 K/UL
MONOCYTES NFR BLD AUTO: 11.2 %
NEUTROPHILS # BLD AUTO: 1.84 K/UL
NEUTROPHILS NFR BLD AUTO: 46.7 %
NT-PROBNP SERPL-MCNC: 10 PG/ML
PLATELET # BLD AUTO: 207 K/UL
POTASSIUM SERPL-SCNC: 4.9 MMOL/L
PROT SERPL-MCNC: 7.1 G/DL
RBC # BLD: 5.05 M/UL
RBC # FLD: 12.5 %
SODIUM SERPL-SCNC: 142 MMOL/L
TIBC SERPL-MCNC: 361 UG/DL
TSH SERPL-ACNC: 3.03 UIU/ML
UIBC SERPL-MCNC: 293 UG/DL
WBC # FLD AUTO: 3.94 K/UL

## 2022-07-15 LAB — HIV 2 PROVIRAL DNA SERPL QL NAA+PROBE: NOT DETECTED

## 2022-11-27 ENCOUNTER — NON-APPOINTMENT (OUTPATIENT)
Age: 22
End: 2022-11-27

## 2022-12-09 NOTE — HISTORY OF PRESENT ILLNESS
[FreeTextEntry1] : Mr. Mera is a 21 year old male with no chronic PMH before he was found to have increased QRS voltage on ECG prompting an echocardiogram in March revealing mildly reduced LV systolic function, confirmed by CMR with LVEF of 40%. He presents today for routine follow-up, initially referred by Dr. Guerra. \par \par He has no past medical history of cardiac disease and during an ER visit in February 2022 for allergic reaction to almond milk (previously has had reaction to PO antibiotic in July 2021). At his recent ER visit he was found to have abnormal ECG tracing for which he was referred to cardiologist Dr. Guerra. His ECG readings during his March visit when compared to tracing from prior ED visits were concerning for increased QRS voltage. Therefore he had TTE performed which demonstrated reduced LV function of 40-45%. Subsequently, underwent cMRI which revealed reduced global LV function (EF 42%) and normal RVF without any myocardial edema or delayed enhancement in both ventricles. \par \par Since last seen in June, _\par He was to have undergone surveillance TTE\par \par He has contracted COVID in the past, was not hospitalized and is now fully vaccinated with J+J vaccines

## 2022-12-09 NOTE — PHYSICAL EXAM
[No Acute Distress] : no acute distress [No Xanthelasma] : no xanthelasma [Normal Venous Pressure] : normal venous pressure [Normal S1, S2] : normal S1, S2 [No Murmur] : no murmur [No Rub] : no rub [No Gallop] : no gallop [Clear Lung Fields] : clear lung fields [Good Air Entry] : good air entry [No Respiratory Distress] : no respiratory distress  [Soft] : abdomen soft [Non Tender] : non-tender [No Edema] : no edema [Normal] : no rash, no skin lesions [Alert and Oriented] : alert and oriented [de-identified] : warm , dry, and intact

## 2022-12-09 NOTE — CARDIOLOGY SUMMARY
[de-identified] : \par 3/18/22 - NSR, short [place] interval, good RWP, non-specific ST-T changes [de-identified] : \par 3/30/22 TTE: LVEF 45-50%, LVIDd 5.5cm, nml LVSF, nml diastolic function, nml RVSF, normal BiAtria, min MR, min TR, RVSP 17 mm Hg [de-identified] : \par 5/20/22 cMRI: LVEF 42%, no regional wall motion abnormalities, no thrombus, no delayed enhancement of LVM,  normal RVSF, no delayed enhancement of RVM, no valvular abnormalities.\par \par \par

## 2022-12-12 ENCOUNTER — APPOINTMENT (OUTPATIENT)
Dept: HEART FAILURE | Facility: CLINIC | Age: 22
End: 2022-12-12

## 2022-12-15 NOTE — ED PROVIDER NOTE - GASTROINTESTINAL, MLM
Abdomen soft, non-tender, no guarding. Post-Care Instructions: I reviewed with the patient in detail post-care instructions. Patient is to wear sunprotection, and avoid picking at any of the treated lesions. Pt may apply Vaseline to crusted or scabbing areas.  The patient was advised that the site will swell and may scab or blister.  Vaseline should be applied to the site twice daily until healed. Blisters should not be popped or ripped off. The patient received detailed post-op instructions and was instructed to call with questions or concerns. Method: liquid nitrogen Render Post-Care In The Note: Yes Render Note In Bullet Format When Appropriate: No Detail Level: Zone Anesthesia Volume In Cc: 0.5 Post-Procedure Care (Optional): The patient was advised and the site will swell and may scab or blister.  Vaseline should be applied to the site twice daily until healed. Blisters should not be popped or ripped off. The patient received detailed post-op instructions and was instructed to call with questions or concerns. Consent: The patient's consent was obtained including but not limited to risks of crusting, scabbing, blistering, scarring, darker or lighter pigmentary change, recurrence, incomplete removal and infection.

## 2022-12-19 ENCOUNTER — APPOINTMENT (OUTPATIENT)
Dept: HEART FAILURE | Facility: CLINIC | Age: 22
End: 2022-12-19

## 2022-12-19 ENCOUNTER — NON-APPOINTMENT (OUTPATIENT)
Age: 22
End: 2022-12-19

## 2022-12-19 VITALS
DIASTOLIC BLOOD PRESSURE: 76 MMHG | OXYGEN SATURATION: 98 % | BODY MASS INDEX: 23.74 KG/M2 | SYSTOLIC BLOOD PRESSURE: 119 MMHG | HEART RATE: 60 BPM | WEIGHT: 185 LBS | HEIGHT: 74 IN

## 2022-12-19 PROCEDURE — 93000 ELECTROCARDIOGRAM COMPLETE: CPT

## 2022-12-19 PROCEDURE — 99214 OFFICE O/P EST MOD 30 MIN: CPT | Mod: 25

## 2022-12-19 NOTE — PHYSICAL EXAM

## 2022-12-19 NOTE — ASSESSMENT
[FreeTextEntry1] : 22 year old male with no chronic PMH, recently found to have abnormal ECG readings with subsequent ECHO and MRI findings with mild-mod LV dysfunction (personally believe it's closer to 45-50%). 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 On exam he is well appearing and euvolemic  and I have made the following recommendations.\par \par # Reduced LVEF 40%) \par - He remains with no functional limitations at this time, will repeat ECHO in upcoming weeks for surveillance; Will have study performed in Dr. Guerra's office\par - Labs 6/6/22: nml renal chemistries, nml thyroid panel and iron studies. Na 142, K+ 4.9, BUN/Cr 13/1.1\par - Encouraged increase/adequate hydration, LH/Dizziness likely in setting of dehydration and moderately active exercise routine\par -He has significant family history ?Hypertrophic cardiomyopathy (maternal Grandfather). He has underwent genetic testing using [employer] but unclear of results. collateral information obtained from his grandfathers cardiologist Dr. Santos. If there is concern for genetic pre-disposition will pursue further workup. \par \par RTC in 6 months with Dr. Mullins

## 2022-12-19 NOTE — CARDIOLOGY SUMMARY
[de-identified] : \par 12/19/22- NSR with sinus arrhythmia, 63 bpm,  short OH interval, \par 3/18/22 - NSR, short OH interval, good RWP, non-specific ST-T changes [de-identified] : \par 3/30/22 TTE: LVEF 45-50%, LVIDd 5.5cm, nml LVSF, nml diastolic function, nml RVSF, normal BiAtria, min MR, min TR, RVSP 17 mm Hg [de-identified] : \par 5/20/22 cMRI: LVEF 42%, no regional wall motion abnormalities, no thrombus, no delayed enhancement of LVM,  normal RVSF, no delayed enhancement of RVM, no valvular abnormalities.\par \par \par

## 2022-12-19 NOTE — HISTORY OF PRESENT ILLNESS
[FreeTextEntry1] : Mr. Mera is a 22 year old male with no chronic PMH with incidentally noted mild-mod LV dysfunction (cMRI LVEF 42%, LVEDD 5.5 cm) after abnormal ECG (increased QRS voltage) identified at ER when had allergic reaction who presents today for follow up of his cardiomyopathy. Referred by Mari. \par \par For full initial details, please refer to note from 6/6/22. \par \par Since last visit, no hospitalizations. Had x1 visit to  for Cough; COVID Negative. No repeat TTE report on record. \par \par He presents to clinic today accompanied by his mother and states he has felt well since last seen. He has remained active in sports with basketball and soccer. Now more recently is going to the gym to work out without limitations and denies any episodes of LH/Dizziness. Denies orthopnea, no PND, LE edema and no abdominal swelling. He is not currently taking any medications. \par \par Has not had follow up with Dr. Guerra as of yet. \par \par He has contracted COVID in the past, was not hospitalized and is now fully vaccinated with J+J vaccines.\par \par Of note, his maternal grandfather reportedly had "athlete's"  heart in his 30s and is now in his 80s and has required a defibrillator. Called his grandfather's cardiologist (Dr. Santos) to obtain more information (after permission provided by the grandfather). Was reportedly found to have HCM (diagnosed in [place]; unclear how) although per the echo there is no thickness noted. Does not appear he's had a cardiac MRI in the past.

## 2022-12-30 ENCOUNTER — APPOINTMENT (OUTPATIENT)
Dept: CARDIOLOGY | Facility: CLINIC | Age: 22
End: 2022-12-30
Payer: COMMERCIAL

## 2022-12-30 ENCOUNTER — TRANSCRIPTION ENCOUNTER (OUTPATIENT)
Age: 22
End: 2022-12-30

## 2022-12-30 PROCEDURE — 93306 TTE W/DOPPLER COMPLETE: CPT

## 2023-09-07 ENCOUNTER — APPOINTMENT (OUTPATIENT)
Dept: HEART FAILURE | Facility: CLINIC | Age: 23
End: 2023-09-07
Payer: COMMERCIAL

## 2023-09-07 VITALS
BODY MASS INDEX: 24 KG/M2 | DIASTOLIC BLOOD PRESSURE: 79 MMHG | HEIGHT: 74 IN | SYSTOLIC BLOOD PRESSURE: 119 MMHG | HEART RATE: 74 BPM | TEMPERATURE: 98 F | WEIGHT: 187 LBS | OXYGEN SATURATION: 96 %

## 2023-09-07 PROCEDURE — 93000 ELECTROCARDIOGRAM COMPLETE: CPT

## 2023-09-08 ENCOUNTER — NON-APPOINTMENT (OUTPATIENT)
Age: 23
End: 2023-09-08

## 2023-09-08 NOTE — CARDIOLOGY SUMMARY
[de-identified] : 9/7/23 ECG: NSR, 76 bpm,  12/19/22- NSR with sinus arrhythmia, 63 bpm,  short TX interval,  3/18/22 - NSR, short TX interval, good RWP, non-specific ST-T changes [de-identified] : 12/23/2022: LVIDd 5.3cm, LVEF 61%, normal wall thickness, nl BiAtra, nl MV, trace TR  3/30/22 TTE: LVEF 45-50%, LVIDd 5.5cm, nml LVSF, nml diastolic function, nml RVSF, normal BiAtria, min MR, min TR, RVSP 17 mm Hg [de-identified] : \par  5/20/22 cMRI: LVEF 42%, no regional wall motion abnormalities, no thrombus, no delayed enhancement of LVM,  normal RVSF, no delayed enhancement of RVM, no valvular abnormalities.\par  \par  \par

## 2023-09-08 NOTE — ASSESSMENT
[FreeTextEntry1] : 23 year old male with no chronic PMH, who was found to have abnormal ECG readings with subsequent ECHO and MRI findings with mild-mod LV dysfunction (personally believe it's closer to 45-50%). 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 On exam he is well appearing and euvolemic. I have made the following recommendations.  # Reduced LVEF 40% now 61% w/ normal wall thickness)  - He remains with no functional limitations at this time, and recent TTE reveals normal wall thickness and normal LVEF. -He has significant family history Hypertrophic cardiomyopathy (maternal Grandfather). He has underwent genetic testing using [employer] but unclear of results. However his mother recently underwent genetic testing which was  unrevealing. Collateral information obtained from his grandfather's cardiologist Dr. Santos and TTE findings w/o abnormal wall thickness. At this time appropriate for him to forego further genetic testing.  He no longer requires follow up with [facility] team can continue routine care with PCP. If there are ant changes in symptoms of TTE findings, then can resume care with HF team.

## 2023-09-08 NOTE — HISTORY OF PRESENT ILLNESS
[FreeTextEntry1] : Mr. Mera is a 23 year old male with no chronic PMH with incidentally noted mild-mod LV dysfunction (cMRI LVEF 42%, LVEDD 5.5 cm) after abnormal ECG (increased QRS voltage) identified at ER when had allergic reaction, who presents today for follow up of his cardiomyopathy. Referred by Mari.   For full initial details, please refer to note from 6/6/22.   Since last visit, no hospitalizations or ED visits. His mother has undergone genetic testing recently which did not show any significant genetic mutations.  He presents to clinic today accompanied by his mother and states he has felt well since last seen. He has remained active in sports with basketball and tennis. He has not had any symptoms of SOB or fatigue. He denies any episodes of LH/Dizziness. Denies orthopnea, no PND, LE edema and no abdominal swelling. He is not currently taking any medications.   He has contracted COVID in the past, was not hospitalized and is now fully vaccinated with J+J vaccines.  Of note, his maternal grandfather reportedly had "athlete's"  heart in his 30s and is now in his 80s and has required a defibrillator. Called his grandfather's cardiologist (Dr. Santos) to obtain more information (after permission provided by the grandfather). Was reportedly found to have HCM (diagnosed in [place]; unclear how) although per the echo there is no thickness noted. Does not appear he's had a cardiac MRI in the past.

## 2024-05-23 ENCOUNTER — NON-APPOINTMENT (OUTPATIENT)
Age: 24
End: 2024-05-23

## 2024-06-11 ENCOUNTER — APPOINTMENT (OUTPATIENT)
Dept: CARDIOLOGY | Facility: CLINIC | Age: 24
End: 2024-06-11
Payer: COMMERCIAL

## 2024-06-11 PROCEDURE — 93306 TTE W/DOPPLER COMPLETE: CPT

## 2024-06-12 ENCOUNTER — NON-APPOINTMENT (OUTPATIENT)
Age: 24
End: 2024-06-12

## 2024-06-12 ENCOUNTER — APPOINTMENT (OUTPATIENT)
Dept: CARDIOLOGY | Facility: CLINIC | Age: 24
End: 2024-06-12
Payer: COMMERCIAL

## 2024-06-12 VITALS
OXYGEN SATURATION: 98 % | HEART RATE: 56 BPM | SYSTOLIC BLOOD PRESSURE: 100 MMHG | RESPIRATION RATE: 15 BRPM | BODY MASS INDEX: 24.51 KG/M2 | WEIGHT: 191 LBS | HEIGHT: 74 IN | DIASTOLIC BLOOD PRESSURE: 70 MMHG

## 2024-06-12 VITALS
HEART RATE: 56 BPM | BODY MASS INDEX: 24.51 KG/M2 | DIASTOLIC BLOOD PRESSURE: 70 MMHG | HEIGHT: 74 IN | OXYGEN SATURATION: 98 % | WEIGHT: 191 LBS | SYSTOLIC BLOOD PRESSURE: 100 MMHG

## 2024-06-12 DIAGNOSIS — R94.31 ABNORMAL ELECTROCARDIOGRAM [ECG] [EKG]: ICD-10-CM

## 2024-06-12 DIAGNOSIS — I51.9 HEART DISEASE, UNSPECIFIED: ICD-10-CM

## 2024-06-12 PROCEDURE — 99213 OFFICE O/P EST LOW 20 MIN: CPT | Mod: 25

## 2024-06-12 PROCEDURE — 93000 ELECTROCARDIOGRAM COMPLETE: CPT

## 2024-06-12 NOTE — REVIEW OF SYSTEMS
[Weight Gain (___ Lbs)] : [unfilled] ~Ulb weight gain [SOB] : no shortness of breath [Dyspnea on exertion] : not dyspnea during exertion [Chest Discomfort] : no chest discomfort [Palpitations] : no palpitations [Syncope] : no syncope [Negative] : Heme/Lymph [de-identified] : SEE HPI

## 2024-06-12 NOTE — HISTORY OF PRESENT ILLNESS
[FreeTextEntry1] : Roberto Mera is a 23-year-old man with a history of abnormal ECG and mild LV systolic dysfunction who returns for cardiac examination after establishing care with me more than 2 years ago.  At that time, I referred him to Dr. 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  He continues to feel well.  He remains athletic--golfs, exercises in the gym several days per week.  He has been feeling well.  He denies chest pain, difficulty breathing, palpitations, syncope.  He describes some problems with attention and is considering initiation of a stimulant medication for the treatment of ADHD.

## 2024-06-12 NOTE — DISCUSSION/SUMMARY
[With Me] : with me [___ Year(s)] : in [unfilled] year(s) [FreeTextEntry1] :  Left ventricular dysfunction: Mild on testing from several years ago but now with resolution--echocardiography performed yesterday revealed normal left ventricular size and function. I recommend repeat imaging in 1 year to ensure stability of improvement in LV function.  Abnormal ECG: Improved.  ** No contraindication from my standpoint for initiation of intermittent use of Adderall for the treatment of ADHD.

## 2024-06-12 NOTE — PHYSICAL EXAM
[No Acute Distress] : no acute distress [Normal S1, S2] : normal S1, S2 [No Murmur] : no murmur [Clear Lung Fields] : clear lung fields [Normal Gait] : normal gait [No Edema] : no edema [Alert and Oriented] : alert and oriented [de-identified] : Pupils are round [de-identified] : No JVD

## 2024-06-12 NOTE — CARDIOLOGY SUMMARY
[de-identified] : 6/12/2024. Sinus Rhythm with short AL interval [de-identified] : 6/11/2024. Left ventricular wall thickness is normal. Left ventricular function is normal with ejection fraction estimated at 60 %. Normal right ventricular size and function. [de-identified] : 5/10/2022 (Cardiac MRI). Normal left ventricular size with decreased systolic function. LVEF = 42%. No myocardial edema. No delayed enhancement was visualized in the left ventricular myocardium.  Normal right ventricular size and systolic function. No delayed enhancement or fatty infiltration was visualized in the right ventricular myocardium. No significant valvular abnormalities.

## 2024-08-26 ENCOUNTER — NON-APPOINTMENT (OUTPATIENT)
Age: 24
End: 2024-08-26